# Patient Record
Sex: FEMALE | Race: WHITE | URBAN - METROPOLITAN AREA
[De-identification: names, ages, dates, MRNs, and addresses within clinical notes are randomized per-mention and may not be internally consistent; named-entity substitution may affect disease eponyms.]

---

## 2022-09-14 ENCOUNTER — OFFICE VISIT (OUTPATIENT)
Dept: URBAN - METROPOLITAN AREA CLINIC 88 | Facility: CLINIC | Age: 62
End: 2022-09-14

## 2022-09-14 DIAGNOSIS — H25.813 COMBINED FORMS OF AGE-RELATED CATARACT, BILATERAL: ICD-10-CM

## 2022-09-14 DIAGNOSIS — E11.9 TYPE 2 DIABETES MELLITUS WITHOUT COMPLICATIONS: Primary | ICD-10-CM

## 2022-09-14 PROCEDURE — 99203 OFFICE O/P NEW LOW 30 MIN: CPT | Performed by: OPHTHALMOLOGY

## 2022-09-14 ASSESSMENT — KERATOMETRY
OS: 45.38
OD: 45.50

## 2022-09-14 ASSESSMENT — VISUAL ACUITY
OS: 20/40
OD: 20/40

## 2022-09-14 ASSESSMENT — INTRAOCULAR PRESSURE
OD: 16
OS: 16

## 2022-09-14 NOTE — IMPRESSION/PLAN
Impression: Combined forms of age-related cataract, bilateral: H25.813. Plan: Cataracts account for the patient's complaints. Discussed all risks, benefits, procedures and recovery. Patient understands changing glasses will not improve vision. Patient desires to have surgery, recommend phacoemulsification with intraocular lens. Surgical risks and benefits were discussed, explained and to patient. RL2 Schedule Cataract sx OD first/then OS. Final post operative refractive decision will be made by Operative Surgeon. Pred-Moxi-Nepafenac/generic drops/dexycu Patient will call to schedule Ascan and Cataract surgery

## 2022-09-14 NOTE — IMPRESSION/PLAN
Impression: Type 2 diabetes mellitus without complications: B59.4. Plan: Reviewed with patient that no retinal vascular changes are occurring from diabetes. Patient to continue care with current medication provider for diabetes management. Importance of retinal exams reviewed. Will continue to observe with dilated examination in 12 months.

## 2022-10-06 ENCOUNTER — OFFICE VISIT (OUTPATIENT)
Dept: URBAN - METROPOLITAN AREA CLINIC 88 | Facility: CLINIC | Age: 62
End: 2022-10-06

## 2022-10-06 DIAGNOSIS — H04.123 DRY EYE SYNDROME OF BILATERAL LACRIMAL GLANDS: ICD-10-CM

## 2022-10-06 DIAGNOSIS — H52.223 REGULAR ASTIGMATISM, BILATERAL: Primary | ICD-10-CM

## 2022-10-06 DIAGNOSIS — H10.523 ANGULAR BLEPHAROCONJUNCTIVITIS, BILATERAL: ICD-10-CM

## 2022-10-06 DIAGNOSIS — H25.813 COMBINED FORMS OF AGE-RELATED CATARACT, BILATERAL: ICD-10-CM

## 2022-10-06 PROCEDURE — 99203 OFFICE O/P NEW LOW 30 MIN: CPT | Performed by: OPTOMETRIST

## 2022-10-06 RX ORDER — NEOMYCIN SULFATE, POLYMYXIN B SULFATE AND DEXAMETHASONE 3.5; 10000; 1 MG/G; [USP'U]/G; MG/G
OINTMENT OPHTHALMIC
Qty: 3.5 | Refills: 0 | Status: ACTIVE
Start: 2022-10-06

## 2022-10-06 ASSESSMENT — INTRAOCULAR PRESSURE
OD: 14
OS: 14

## 2022-10-06 ASSESSMENT — VISUAL ACUITY
OS: 20/50
OD: 20/70

## 2022-10-06 NOTE — IMPRESSION/PLAN
Impression: Combined forms of age-related cataract, bilateral: H25.813. Plan: Pt decided to cancel cataract surgery at this time due to cost.  Will re-consider in future.

## 2022-10-06 NOTE — IMPRESSION/PLAN
Impression: Regular astigmatism, bilateral: H52.223. Plan: Reviewed refractive prescription in detail with patient and need for glasses to improve vision at this time. Discussed lens options and designs. Ok to release spectacle prescription. Ed pt BCVA with new glasses limited due to cataracts OU.

## 2022-10-06 NOTE — IMPRESSION/PLAN
Impression: Angular blepharoconjunctivitis, bilateral: H10.523. Plan: Discussed diagnosis in detail with patient. Discussed treatment options with patient. Rx Maxitrol dannielle BID x 10 days applied to affected areas of lids/lashes.   Lid scrubs with Ocusoft lid wipes or baby shampoo BID OU

## 2023-08-02 ENCOUNTER — OFFICE VISIT (OUTPATIENT)
Dept: URBAN - METROPOLITAN AREA CLINIC 88 | Facility: CLINIC | Age: 63
End: 2023-08-02

## 2023-08-02 DIAGNOSIS — L20.9 ATOPIC DERMATITIS: ICD-10-CM

## 2023-08-02 DIAGNOSIS — H52.223 REGULAR ASTIGMATISM, BILATERAL: ICD-10-CM

## 2023-08-02 DIAGNOSIS — H25.813 COMBINED FORMS OF AGE-RELATED CATARACT, BILATERAL: ICD-10-CM

## 2023-08-02 DIAGNOSIS — H01.022 SQUAMOUS BLEPHARITIS RIGHT LOWER EYELID: ICD-10-CM

## 2023-08-02 DIAGNOSIS — H04.123 DRY EYE SYNDROME OF BILATERAL LACRIMAL GLANDS: Primary | ICD-10-CM

## 2023-08-02 DIAGNOSIS — H01.025 SQUAMOUS BLEPHARITIS LEFT LOWER EYELID: ICD-10-CM

## 2023-08-02 PROCEDURE — 92012 INTRM OPH EXAM EST PATIENT: CPT | Performed by: OPTOMETRIST

## 2023-08-02 RX ORDER — NEOMYCIN SULFATE, POLYMYXIN B SULFATE AND DEXAMETHASONE 3.5; 10000; 1 MG/G; [USP'U]/G; MG/G
OINTMENT OPHTHALMIC
Qty: 3.5 | Refills: 1 | Status: ACTIVE
Start: 2023-08-02

## 2023-08-02 ASSESSMENT — VISUAL ACUITY
OS: 20/30
OD: 20/30

## 2023-08-02 ASSESSMENT — INTRAOCULAR PRESSURE
OD: 16
OS: 17